# Patient Record
Sex: FEMALE | Race: WHITE | NOT HISPANIC OR LATINO | URBAN - METROPOLITAN AREA
[De-identification: names, ages, dates, MRNs, and addresses within clinical notes are randomized per-mention and may not be internally consistent; named-entity substitution may affect disease eponyms.]

---

## 2022-12-06 ENCOUNTER — OFFICE (OUTPATIENT)
Dept: URBAN - METROPOLITAN AREA CLINIC 67 | Facility: CLINIC | Age: 22
End: 2022-12-06
Payer: COMMERCIAL

## 2022-12-06 VITALS
SYSTOLIC BLOOD PRESSURE: 114 MMHG | HEART RATE: 77 BPM | HEIGHT: 63 IN | OXYGEN SATURATION: 99 % | WEIGHT: 235 LBS | DIASTOLIC BLOOD PRESSURE: 72 MMHG

## 2022-12-06 DIAGNOSIS — K90.0 CELIAC DISEASE: ICD-10-CM

## 2022-12-06 DIAGNOSIS — K21.9 GASTRO-ESOPHAGEAL REFLUX DISEASE WITHOUT ESOPHAGITIS: ICD-10-CM

## 2022-12-06 PROCEDURE — 99204 OFFICE O/P NEW MOD 45 MIN: CPT | Performed by: INTERNAL MEDICINE

## 2022-12-06 NOTE — SERVICENOTES
I will get a copy of her records from [place] for review and will check the above blood work. For now, I will have her return to see me in 1 year for follow-up or sooner if needed.

## 2022-12-06 NOTE — SERVICEHPINOTES
Cherelle Medley   is seen for an initial visit today.
br
jacob Per her report, she was diagnosed with celiac disease in 2018 (while living in [place]) when she underwent endoscopy in follow-up of elevated celiac markers (those records are forthcoming) 
br Per her report, her main symptom prior to her diagnosis was reflux/GERD and she has been trying to adhere to a gluten-free diet as best as she can but she admits that she has not be stringent with her diet. 
br   She still has some intermittent reflux symptoms (not severe enough to take medication regularly) and has previously been on different PPIs without a great response except for Dexilant. She denies any dysphagia, emesis or GI tract bleeding symptoms and there is no known family history of celiac disease.

## 2024-03-12 ENCOUNTER — OFFICE (OUTPATIENT)
Dept: URBAN - METROPOLITAN AREA CLINIC 67 | Facility: CLINIC | Age: 24
End: 2024-03-12

## 2024-03-12 VITALS
HEIGHT: 63 IN | OXYGEN SATURATION: 99 % | SYSTOLIC BLOOD PRESSURE: 128 MMHG | DIASTOLIC BLOOD PRESSURE: 70 MMHG | HEART RATE: 111 BPM | WEIGHT: 238 LBS

## 2024-03-12 DIAGNOSIS — K90.0 CELIAC DISEASE: ICD-10-CM

## 2024-03-12 DIAGNOSIS — K21.9 GASTRO-ESOPHAGEAL REFLUX DISEASE WITHOUT ESOPHAGITIS: ICD-10-CM

## 2024-03-12 PROCEDURE — 99214 OFFICE O/P EST MOD 30 MIN: CPT | Performed by: INTERNAL MEDICINE

## 2024-03-12 NOTE — SERVICEHPINOTES
Cherelle Medley   is seen today for a follow-up visit regarding celiac disease and chronic GERD. Per her report, she was diagnosed with celiac disease in 2018 (while living in [place]) when she underwent endoscopy in follow-up of elevated celiac markers.brPer her report, her main symptom prior to her diagnosis was reflux/GERD and she has been trying to adhere to a gluten-free diet as best as she can.brBlood work done at the time of her last appointment in 12/2022 demonstrated a normal/negative tTG IgA Ab titer, B12/folate, free T4 and ferritin but low Vit D level at 20.6.Today, she reports that she has been seeing an othropedist at [place] regarding chronic bilateral shin pain - but from a GI standpoint, she has been doing well and her only complaint is ongoing weight gain. She admits that she has not been vigilant with regards to adhering to a gluten-free diet.

## 2024-03-12 NOTE — SERVICENOTES
I will check the above blood work and refer her to get more information regarding our [employer] program for weight loss. 
For now, I will have her return for follow-up in 1 year or sooner if needed.